# Patient Record
Sex: MALE | Race: WHITE | NOT HISPANIC OR LATINO | URBAN - METROPOLITAN AREA
[De-identification: names, ages, dates, MRNs, and addresses within clinical notes are randomized per-mention and may not be internally consistent; named-entity substitution may affect disease eponyms.]

---

## 2017-06-21 ENCOUNTER — EMERGENCY (EMERGENCY)
Facility: HOSPITAL | Age: 57
LOS: 1 days | Discharge: HOME | End: 2017-06-21
Attending: EMERGENCY MEDICINE | Admitting: EMERGENCY MEDICINE
Payer: COMMERCIAL

## 2017-06-21 VITALS
RESPIRATION RATE: 16 BRPM | SYSTOLIC BLOOD PRESSURE: 111 MMHG | HEART RATE: 68 BPM | DIASTOLIC BLOOD PRESSURE: 79 MMHG | OXYGEN SATURATION: 100 % | TEMPERATURE: 98 F

## 2017-06-21 PROCEDURE — 99282 EMERGENCY DEPT VISIT SF MDM: CPT | Mod: 25

## 2017-06-21 NOTE — ED ADULT TRIAGE NOTE - CHIEF COMPLAINT QUOTE
comes to ED for pain and swelling in left big toe. states noticed swelling yesterday. toe is red and warm to touch. appears comfortable. states took ibuprofen prior to arrival.

## 2017-06-22 RX ORDER — ACETAMINOPHEN 500 MG
975 TABLET ORAL ONCE
Qty: 0 | Refills: 0 | Status: COMPLETED | OUTPATIENT
Start: 2017-06-22 | End: 2017-06-22

## 2017-06-22 RX ADMIN — Medication 975 MILLIGRAM(S): at 01:21

## 2017-06-22 RX ADMIN — Medication 300 MILLIGRAM(S): at 01:21

## 2017-06-22 NOTE — ED PROVIDER NOTE - PHYSICAL EXAMINATION
Left ankle and foot without swelling or tenderness.  2+ DP and PT pulses.  1st toe with erythema, mild swelling, and warmth to dorsal aspect of toe c/w cellulitis.  Extends from toenail which is cracked with onychomycosis.  IP and MTP joints with FROM without discomfort.

## 2017-06-22 NOTE — ED PROVIDER NOTE - MEDICAL DECISION MAKING DETAILS
Cellulitis on left 1st toe.  Unlikely septic or inflammatory joint as FROM.  PCN allergy.  Will prescribe clinda.  Has PMD for f/u and given list of podiatrists.  Discussed discharge instructions.

## 2017-08-28 NOTE — ED PROVIDER NOTE - EYES NEGATIVE STATEMENT, MLM
Pt called in has some concerns with his right foot? Pt thinks it might be infected. Please advise. [phone]   no discharge, no irritation, no pain, no redness, and no visual changes.

## 2020-06-04 NOTE — ED ADULT TRIAGE NOTE - NS ED TRIAGE AVPU SCALE
Virtual/Telephone Check-In    [facility] verbally {consents to a [employer] on 06/04/20. Patient has been referred to the [facility] website at [email or web address] to review the yearly Consent to Treat document.   Patient unders Alert-The patient is alert, awake and responds to voice. The patient is oriented to time, place, and person. The triage nurse is able to obtain subjective information.

## 2025-05-14 NOTE — ED PROVIDER NOTE - OBJECTIVE STATEMENT
55 y/o M with wife at bedside.  c/o left 1st toe redness and pain since Tues night.  Never had before.  No known trauma.  No fever.  Denies other pain or complaint.  No other rash. (3) no apparent problem